# Patient Record
Sex: MALE | Race: WHITE | NOT HISPANIC OR LATINO | URBAN - METROPOLITAN AREA
[De-identification: names, ages, dates, MRNs, and addresses within clinical notes are randomized per-mention and may not be internally consistent; named-entity substitution may affect disease eponyms.]

---

## 2017-01-03 ENCOUNTER — HOME CARE/HOSPICE - HEALTHEAST (OUTPATIENT)
Dept: HOME HEALTH SERVICES | Facility: HOME HEALTH | Age: 82
End: 2017-01-03

## 2017-01-05 ENCOUNTER — COMMUNICATION - HEALTHEAST (OUTPATIENT)
Dept: INTERNAL MEDICINE | Facility: CLINIC | Age: 82
End: 2017-01-05

## 2017-01-06 ENCOUNTER — AMBULATORY - HEALTHEAST (OUTPATIENT)
Dept: GERIATRICS | Facility: CLINIC | Age: 82
End: 2017-01-06

## 2017-01-09 ENCOUNTER — OFFICE VISIT - HEALTHEAST (OUTPATIENT)
Dept: GERIATRICS | Facility: CLINIC | Age: 82
End: 2017-01-09

## 2017-01-09 DIAGNOSIS — J18.9 CAP (COMMUNITY ACQUIRED PNEUMONIA): ICD-10-CM

## 2017-01-09 DIAGNOSIS — I48.0 PAROXYSMAL ATRIAL FIBRILLATION (H): ICD-10-CM

## 2017-01-09 DIAGNOSIS — I50.9 CONGESTIVE HEART FAILURE (H): ICD-10-CM

## 2017-01-09 DIAGNOSIS — I10 ESSENTIAL HYPERTENSION: ICD-10-CM

## 2017-01-10 ENCOUNTER — AMBULATORY - HEALTHEAST (OUTPATIENT)
Dept: GERIATRICS | Facility: CLINIC | Age: 82
End: 2017-01-10

## 2017-01-12 ENCOUNTER — OFFICE VISIT - HEALTHEAST (OUTPATIENT)
Dept: GERIATRICS | Facility: CLINIC | Age: 82
End: 2017-01-12

## 2017-01-12 DIAGNOSIS — I10 ESSENTIAL HYPERTENSION: ICD-10-CM

## 2017-01-12 DIAGNOSIS — I48.0 PAROXYSMAL ATRIAL FIBRILLATION (H): ICD-10-CM

## 2017-01-12 DIAGNOSIS — I50.9 CONGESTIVE HEART FAILURE (H): ICD-10-CM

## 2017-01-12 DIAGNOSIS — J18.9 CAP (COMMUNITY ACQUIRED PNEUMONIA): ICD-10-CM

## 2017-01-13 ENCOUNTER — AMBULATORY - HEALTHEAST (OUTPATIENT)
Dept: GERIATRICS | Facility: CLINIC | Age: 82
End: 2017-01-13

## 2017-01-16 ENCOUNTER — OFFICE VISIT - HEALTHEAST (OUTPATIENT)
Dept: GERIATRICS | Facility: CLINIC | Age: 82
End: 2017-01-16

## 2017-01-16 DIAGNOSIS — I10 ESSENTIAL HYPERTENSION: ICD-10-CM

## 2017-01-16 DIAGNOSIS — I50.9 CONGESTIVE HEART FAILURE (H): ICD-10-CM

## 2017-01-16 DIAGNOSIS — I48.0 PAROXYSMAL ATRIAL FIBRILLATION (H): ICD-10-CM

## 2017-01-16 DIAGNOSIS — J18.9 CAP (COMMUNITY ACQUIRED PNEUMONIA): ICD-10-CM

## 2017-01-17 ENCOUNTER — AMBULATORY - HEALTHEAST (OUTPATIENT)
Dept: GERIATRICS | Facility: CLINIC | Age: 82
End: 2017-01-17

## 2017-01-18 ENCOUNTER — COMMUNICATION - HEALTHEAST (OUTPATIENT)
Dept: INTERNAL MEDICINE | Facility: CLINIC | Age: 82
End: 2017-01-18

## 2017-01-18 ENCOUNTER — COMMUNICATION - HEALTHEAST (OUTPATIENT)
Dept: GERIATRICS | Facility: CLINIC | Age: 82
End: 2017-01-18

## 2017-01-18 ENCOUNTER — AMBULATORY - HEALTHEAST (OUTPATIENT)
Dept: LAB | Facility: CLINIC | Age: 82
End: 2017-01-18

## 2017-01-18 DIAGNOSIS — I48.91 ATRIAL FIBRILLATION (H): ICD-10-CM

## 2017-02-22 ENCOUNTER — OFFICE VISIT - HEALTHEAST (OUTPATIENT)
Dept: INTERNAL MEDICINE | Facility: CLINIC | Age: 82
End: 2017-02-22

## 2017-02-22 ENCOUNTER — COMMUNICATION - HEALTHEAST (OUTPATIENT)
Dept: INTERNAL MEDICINE | Facility: CLINIC | Age: 82
End: 2017-02-22

## 2017-02-22 DIAGNOSIS — S09.90XA HEAD INJURY, INITIAL ENCOUNTER: ICD-10-CM

## 2017-02-22 ASSESSMENT — MIFFLIN-ST. JEOR: SCORE: 1572.11

## 2017-03-01 ENCOUNTER — AMBULATORY - HEALTHEAST (OUTPATIENT)
Dept: LAB | Facility: CLINIC | Age: 82
End: 2017-03-01

## 2017-03-01 DIAGNOSIS — I48.91 ATRIAL FIBRILLATION (H): ICD-10-CM

## 2017-03-07 ENCOUNTER — COMMUNICATION - HEALTHEAST (OUTPATIENT)
Dept: INTERNAL MEDICINE | Facility: CLINIC | Age: 82
End: 2017-03-07

## 2017-03-07 DIAGNOSIS — R53.1 WEAKNESS: ICD-10-CM

## 2017-03-07 DIAGNOSIS — R26.89 BALANCE PROBLEM: ICD-10-CM

## 2017-03-10 ENCOUNTER — RECORDS - HEALTHEAST (OUTPATIENT)
Dept: ADMINISTRATIVE | Facility: OTHER | Age: 82
End: 2017-03-10

## 2017-03-15 ENCOUNTER — RECORDS - HEALTHEAST (OUTPATIENT)
Dept: ADMINISTRATIVE | Facility: OTHER | Age: 82
End: 2017-03-15

## 2017-03-20 ENCOUNTER — RECORDS - HEALTHEAST (OUTPATIENT)
Dept: ADMINISTRATIVE | Facility: OTHER | Age: 82
End: 2017-03-20

## 2017-03-23 ENCOUNTER — RECORDS - HEALTHEAST (OUTPATIENT)
Dept: ADMINISTRATIVE | Facility: OTHER | Age: 82
End: 2017-03-23

## 2017-03-31 ENCOUNTER — COMMUNICATION - HEALTHEAST (OUTPATIENT)
Dept: INTERNAL MEDICINE | Facility: CLINIC | Age: 82
End: 2017-03-31

## 2017-03-31 ENCOUNTER — RECORDS - HEALTHEAST (OUTPATIENT)
Dept: ADMINISTRATIVE | Facility: OTHER | Age: 82
End: 2017-03-31

## 2017-04-04 ENCOUNTER — COMMUNICATION - HEALTHEAST (OUTPATIENT)
Dept: INTERNAL MEDICINE | Facility: CLINIC | Age: 82
End: 2017-04-04

## 2017-04-04 ENCOUNTER — RECORDS - HEALTHEAST (OUTPATIENT)
Dept: ADMINISTRATIVE | Facility: OTHER | Age: 82
End: 2017-04-04

## 2017-04-05 ENCOUNTER — RECORDS - HEALTHEAST (OUTPATIENT)
Dept: ADMINISTRATIVE | Facility: OTHER | Age: 82
End: 2017-04-05

## 2017-04-05 ENCOUNTER — COMMUNICATION - HEALTHEAST (OUTPATIENT)
Dept: INTERNAL MEDICINE | Facility: CLINIC | Age: 82
End: 2017-04-05

## 2017-04-12 ENCOUNTER — AMBULATORY - HEALTHEAST (OUTPATIENT)
Dept: LAB | Facility: CLINIC | Age: 82
End: 2017-04-12

## 2017-04-12 DIAGNOSIS — I48.91 ATRIAL FIBRILLATION (H): ICD-10-CM

## 2017-05-17 ENCOUNTER — AMBULATORY - HEALTHEAST (OUTPATIENT)
Dept: LAB | Facility: CLINIC | Age: 82
End: 2017-05-17

## 2017-05-17 DIAGNOSIS — I48.91 ATRIAL FIBRILLATION (H): ICD-10-CM

## 2017-05-17 DIAGNOSIS — Z79.01 LONG TERM (CURRENT) USE OF ANTICOAGULANTS: ICD-10-CM

## 2017-06-07 ENCOUNTER — RECORDS - HEALTHEAST (OUTPATIENT)
Dept: ADMINISTRATIVE | Facility: OTHER | Age: 82
End: 2017-06-07

## 2017-06-21 ENCOUNTER — AMBULATORY - HEALTHEAST (OUTPATIENT)
Dept: LAB | Facility: CLINIC | Age: 82
End: 2017-06-21

## 2017-06-21 DIAGNOSIS — Z79.01 LONG TERM (CURRENT) USE OF ANTICOAGULANTS: ICD-10-CM

## 2017-06-21 DIAGNOSIS — I48.91 ATRIAL FIBRILLATION (H): ICD-10-CM

## 2017-07-20 ENCOUNTER — OFFICE VISIT - HEALTHEAST (OUTPATIENT)
Dept: INTERNAL MEDICINE | Facility: CLINIC | Age: 82
End: 2017-07-20

## 2017-07-20 DIAGNOSIS — I48.91 ATRIAL FIBRILLATION (H): ICD-10-CM

## 2017-07-20 DIAGNOSIS — R61 NIGHT SWEATS: ICD-10-CM

## 2017-07-20 DIAGNOSIS — R73.09 ELEVATED GLUCOSE: ICD-10-CM

## 2017-07-20 DIAGNOSIS — D64.9 ANEMIA: ICD-10-CM

## 2017-07-20 DIAGNOSIS — R53.83 FATIGUE: ICD-10-CM

## 2017-07-20 DIAGNOSIS — I10 HTN (HYPERTENSION): ICD-10-CM

## 2017-07-20 LAB — HBA1C MFR BLD: 5.8 % (ref 3.5–6)

## 2017-07-21 LAB
PATH ICD:: NORMAL
PROT PATTERN SERPL IFE-IMP: NORMAL
REVIEWING PATHOLOGIST: NORMAL

## 2017-07-24 LAB
ALBUMIN PERCENT: 63.1 % (ref 51–67)
ALBUMIN SERPL ELPH-MCNC: 4.2 G/DL (ref 3.2–4.7)
ALPHA 1 PERCENT: 2.2 % (ref 2–4)
ALPHA 2 PERCENT: 11.1 % (ref 5–13)
ALPHA1 GLOB SERPL ELPH-MCNC: 0.1 G/DL (ref 0.1–0.3)
ALPHA2 GLOB SERPL ELPH-MCNC: 0.7 G/DL (ref 0.4–0.9)
B-GLOBULIN SERPL ELPH-MCNC: 0.8 G/DL (ref 0.7–1.2)
BETA PERCENT: 11.8 % (ref 10–17)
GAMMA GLOB SERPL ELPH-MCNC: 0.8 G/DL (ref 0.6–1.4)
GAMMA GLOBULIN PERCENT: 11.8 % (ref 9–20)
PATH ICD:: NORMAL
PROT PATTERN SERPL ELPH-IMP: NORMAL
PROT SERPL-MCNC: 6.7 G/DL (ref 6–8)
REVIEWING PATHOLOGIST: NORMAL

## 2017-07-26 ENCOUNTER — AMBULATORY - HEALTHEAST (OUTPATIENT)
Dept: LAB | Facility: CLINIC | Age: 82
End: 2017-07-26

## 2017-07-26 DIAGNOSIS — I48.91 ATRIAL FIBRILLATION (H): ICD-10-CM

## 2017-07-26 DIAGNOSIS — Z79.01 LONG TERM (CURRENT) USE OF ANTICOAGULANTS: ICD-10-CM

## 2017-07-27 ENCOUNTER — OFFICE VISIT - HEALTHEAST (OUTPATIENT)
Dept: INTERNAL MEDICINE | Facility: CLINIC | Age: 82
End: 2017-07-27

## 2017-07-27 DIAGNOSIS — R13.10 DYSPHAGIA: ICD-10-CM

## 2017-07-27 DIAGNOSIS — D50.9 IDA (IRON DEFICIENCY ANEMIA): ICD-10-CM

## 2017-08-04 ENCOUNTER — AMBULATORY - HEALTHEAST (OUTPATIENT)
Dept: ADMINISTRATIVE | Facility: CLINIC | Age: 82
End: 2017-08-04

## 2017-08-04 DIAGNOSIS — R13.10 DYSPHAGIA: ICD-10-CM

## 2017-08-04 DIAGNOSIS — D50.9 IDA (IRON DEFICIENCY ANEMIA): ICD-10-CM

## 2017-08-14 ENCOUNTER — COMMUNICATION - HEALTHEAST (OUTPATIENT)
Dept: INTERNAL MEDICINE | Facility: CLINIC | Age: 82
End: 2017-08-14

## 2017-08-14 DIAGNOSIS — R13.10 DYSPHAGIA: ICD-10-CM

## 2017-08-15 ENCOUNTER — COMMUNICATION - HEALTHEAST (OUTPATIENT)
Dept: INTERNAL MEDICINE | Facility: CLINIC | Age: 82
End: 2017-08-15

## 2017-08-29 ENCOUNTER — AMBULATORY - HEALTHEAST (OUTPATIENT)
Dept: LAB | Facility: CLINIC | Age: 82
End: 2017-08-29

## 2017-08-29 DIAGNOSIS — I48.91 ATRIAL FIBRILLATION (H): ICD-10-CM

## 2017-08-29 DIAGNOSIS — Z79.01 LONG TERM (CURRENT) USE OF ANTICOAGULANTS: ICD-10-CM

## 2017-09-06 ENCOUNTER — RECORDS - HEALTHEAST (OUTPATIENT)
Dept: ADMINISTRATIVE | Facility: OTHER | Age: 82
End: 2017-09-06

## 2017-09-07 ENCOUNTER — RECORDS - HEALTHEAST (OUTPATIENT)
Dept: ADMINISTRATIVE | Facility: OTHER | Age: 82
End: 2017-09-07

## 2017-09-08 ENCOUNTER — RECORDS - HEALTHEAST (OUTPATIENT)
Dept: ADMINISTRATIVE | Facility: OTHER | Age: 82
End: 2017-09-08

## 2017-09-12 ENCOUNTER — COMMUNICATION - HEALTHEAST (OUTPATIENT)
Dept: INTERNAL MEDICINE | Facility: CLINIC | Age: 82
End: 2017-09-12

## 2017-09-29 ENCOUNTER — COMMUNICATION - HEALTHEAST (OUTPATIENT)
Dept: INTERNAL MEDICINE | Facility: CLINIC | Age: 82
End: 2017-09-29

## 2017-10-04 ENCOUNTER — AMBULATORY - HEALTHEAST (OUTPATIENT)
Dept: LAB | Facility: CLINIC | Age: 82
End: 2017-10-04

## 2017-10-04 DIAGNOSIS — I48.91 ATRIAL FIBRILLATION (H): ICD-10-CM

## 2017-10-04 DIAGNOSIS — Z79.01 LONG-TERM (CURRENT) USE OF ANTICOAGULANTS: ICD-10-CM

## 2017-11-09 ENCOUNTER — AMBULATORY - HEALTHEAST (OUTPATIENT)
Dept: LAB | Facility: CLINIC | Age: 82
End: 2017-11-09

## 2017-11-09 DIAGNOSIS — Z79.01 LONG-TERM (CURRENT) USE OF ANTICOAGULANTS: ICD-10-CM

## 2017-11-09 DIAGNOSIS — I48.91 ATRIAL FIBRILLATION (H): ICD-10-CM

## 2017-11-16 ENCOUNTER — RECORDS - HEALTHEAST (OUTPATIENT)
Dept: ADMINISTRATIVE | Facility: OTHER | Age: 82
End: 2017-11-16

## 2017-11-17 ENCOUNTER — COMMUNICATION - HEALTHEAST (OUTPATIENT)
Dept: INTERNAL MEDICINE | Facility: CLINIC | Age: 82
End: 2017-11-17

## 2017-11-17 ENCOUNTER — RECORDS - HEALTHEAST (OUTPATIENT)
Dept: ADMINISTRATIVE | Facility: OTHER | Age: 82
End: 2017-11-17

## 2017-11-28 ENCOUNTER — RECORDS - HEALTHEAST (OUTPATIENT)
Dept: GENERAL RADIOLOGY | Facility: CLINIC | Age: 82
End: 2017-11-28

## 2017-11-28 ENCOUNTER — OFFICE VISIT - HEALTHEAST (OUTPATIENT)
Dept: INTERNAL MEDICINE | Facility: CLINIC | Age: 82
End: 2017-11-28

## 2017-11-28 DIAGNOSIS — R07.81 PLEURODYNIA: ICD-10-CM

## 2017-11-28 DIAGNOSIS — R07.81 RIB PAIN ON LEFT SIDE: ICD-10-CM

## 2017-12-19 ENCOUNTER — AMBULATORY - HEALTHEAST (OUTPATIENT)
Dept: LAB | Facility: CLINIC | Age: 82
End: 2017-12-19

## 2017-12-19 DIAGNOSIS — Z79.01 LONG-TERM (CURRENT) USE OF ANTICOAGULANTS: ICD-10-CM

## 2017-12-19 DIAGNOSIS — I48.91 ATRIAL FIBRILLATION (H): ICD-10-CM

## 2018-01-29 ENCOUNTER — AMBULATORY - HEALTHEAST (OUTPATIENT)
Dept: LAB | Facility: CLINIC | Age: 83
End: 2018-01-29

## 2018-01-29 DIAGNOSIS — I48.91 ATRIAL FIBRILLATION (H): ICD-10-CM

## 2018-01-29 DIAGNOSIS — Z79.01 LONG-TERM (CURRENT) USE OF ANTICOAGULANTS: ICD-10-CM

## 2018-01-29 LAB — INR PPP: 1.92 (ref 0.9–1.1)

## 2018-02-27 ENCOUNTER — AMBULATORY - HEALTHEAST (OUTPATIENT)
Dept: LAB | Facility: CLINIC | Age: 83
End: 2018-02-27

## 2018-02-27 ENCOUNTER — RECORDS - HEALTHEAST (OUTPATIENT)
Dept: ADMINISTRATIVE | Facility: OTHER | Age: 83
End: 2018-02-27

## 2018-02-27 DIAGNOSIS — Z79.01 LONG-TERM (CURRENT) USE OF ANTICOAGULANTS: ICD-10-CM

## 2018-02-27 DIAGNOSIS — I48.91 ATRIAL FIBRILLATION (H): ICD-10-CM

## 2018-02-27 LAB — INR PPP: 1.82 (ref 0.9–1.1)

## 2018-04-05 ENCOUNTER — AMBULATORY - HEALTHEAST (OUTPATIENT)
Dept: LAB | Facility: CLINIC | Age: 83
End: 2018-04-05

## 2018-04-05 DIAGNOSIS — I48.91 ATRIAL FIBRILLATION (H): ICD-10-CM

## 2018-04-05 DIAGNOSIS — Z79.01 LONG-TERM (CURRENT) USE OF ANTICOAGULANTS: ICD-10-CM

## 2018-04-05 LAB — INR PPP: 3.45 (ref 0.9–1.1)

## 2018-04-12 ENCOUNTER — COMMUNICATION - HEALTHEAST (OUTPATIENT)
Dept: INTERNAL MEDICINE | Facility: CLINIC | Age: 83
End: 2018-04-12

## 2018-04-13 ENCOUNTER — OFFICE VISIT - HEALTHEAST (OUTPATIENT)
Dept: INTERNAL MEDICINE | Facility: CLINIC | Age: 83
End: 2018-04-13

## 2018-04-13 DIAGNOSIS — I48.91 ATRIAL FIBRILLATION (H): ICD-10-CM

## 2018-04-13 DIAGNOSIS — R19.7 DIARRHEA: ICD-10-CM

## 2018-04-13 LAB
ALBUMIN SERPL-MCNC: 3.7 G/DL (ref 3.5–5)
ALP SERPL-CCNC: 89 U/L (ref 45–120)
ALT SERPL W P-5'-P-CCNC: 12 U/L (ref 0–45)
ANION GAP SERPL CALCULATED.3IONS-SCNC: 8 MMOL/L (ref 5–18)
AST SERPL W P-5'-P-CCNC: 15 U/L (ref 0–40)
BASOPHILS # BLD AUTO: 0 THOU/UL (ref 0–0.2)
BASOPHILS NFR BLD AUTO: 1 % (ref 0–2)
BILIRUB SERPL-MCNC: 1.3 MG/DL (ref 0–1)
BUN SERPL-MCNC: 8 MG/DL (ref 8–28)
CALCIUM SERPL-MCNC: 9.2 MG/DL (ref 8.5–10.5)
CHLORIDE BLD-SCNC: 106 MMOL/L (ref 98–107)
CO2 SERPL-SCNC: 27 MMOL/L (ref 22–31)
CREAT SERPL-MCNC: 0.82 MG/DL (ref 0.7–1.3)
EOSINOPHIL # BLD AUTO: 0.1 THOU/UL (ref 0–0.4)
EOSINOPHIL NFR BLD AUTO: 2 % (ref 0–6)
ERYTHROCYTE [DISTWIDTH] IN BLOOD BY AUTOMATED COUNT: 12.6 % (ref 11–14.5)
GFR SERPL CREATININE-BSD FRML MDRD: >60 ML/MIN/1.73M2
GLUCOSE BLD-MCNC: 89 MG/DL (ref 70–125)
HCT VFR BLD AUTO: 42.6 % (ref 40–54)
HGB BLD-MCNC: 14.4 G/DL (ref 14–18)
INR PPP: 3.85 (ref 0.9–1.1)
LYMPHOCYTES # BLD AUTO: 1.9 THOU/UL (ref 0.8–4.4)
LYMPHOCYTES NFR BLD AUTO: 39 % (ref 20–40)
MCH RBC QN AUTO: 31.6 PG (ref 27–34)
MCHC RBC AUTO-ENTMCNC: 33.9 G/DL (ref 32–36)
MCV RBC AUTO: 93 FL (ref 80–100)
MONOCYTES # BLD AUTO: 0.4 THOU/UL (ref 0–0.9)
MONOCYTES NFR BLD AUTO: 7 % (ref 2–10)
NEUTROPHILS # BLD AUTO: 2.4 THOU/UL (ref 2–7.7)
NEUTROPHILS NFR BLD AUTO: 51 % (ref 50–70)
PLATELET # BLD AUTO: 177 THOU/UL (ref 140–440)
PMV BLD AUTO: 7.7 FL (ref 7–10)
POTASSIUM BLD-SCNC: 4.2 MMOL/L (ref 3.5–5)
PROT SERPL-MCNC: 6.8 G/DL (ref 6–8)
RBC # BLD AUTO: 4.57 MILL/UL (ref 4.4–6.2)
SODIUM SERPL-SCNC: 141 MMOL/L (ref 136–145)
WBC: 4.8 THOU/UL (ref 4–11)

## 2018-04-16 ENCOUNTER — AMBULATORY - HEALTHEAST (OUTPATIENT)
Dept: LAB | Facility: CLINIC | Age: 83
End: 2018-04-16

## 2018-04-16 DIAGNOSIS — R19.7 DIARRHEA: ICD-10-CM

## 2018-04-17 ENCOUNTER — COMMUNICATION - HEALTHEAST (OUTPATIENT)
Dept: LAB | Facility: CLINIC | Age: 83
End: 2018-04-17

## 2018-04-17 LAB
G LAMBLIA AG STL QL IA: NORMAL
SHIGA TOXIN 1: NEGATIVE
SHIGA TOXIN 2: NEGATIVE

## 2018-04-19 LAB — BACTERIA SPEC CULT: NORMAL

## 2021-05-27 ENCOUNTER — RECORDS - HEALTHEAST (OUTPATIENT)
Dept: ADMINISTRATIVE | Facility: CLINIC | Age: 86
End: 2021-05-27

## 2021-05-28 ENCOUNTER — RECORDS - HEALTHEAST (OUTPATIENT)
Dept: ADMINISTRATIVE | Facility: CLINIC | Age: 86
End: 2021-05-28

## 2021-05-29 ENCOUNTER — RECORDS - HEALTHEAST (OUTPATIENT)
Dept: ADMINISTRATIVE | Facility: CLINIC | Age: 86
End: 2021-05-29

## 2021-05-30 ENCOUNTER — RECORDS - HEALTHEAST (OUTPATIENT)
Dept: ADMINISTRATIVE | Facility: CLINIC | Age: 86
End: 2021-05-30

## 2021-05-30 VITALS — WEIGHT: 188 LBS | BODY MASS INDEX: 26.98 KG/M2

## 2021-05-30 VITALS — BODY MASS INDEX: 27.16 KG/M2 | HEIGHT: 71 IN | WEIGHT: 194 LBS

## 2021-06-01 ENCOUNTER — RECORDS - HEALTHEAST (OUTPATIENT)
Dept: ADMINISTRATIVE | Facility: CLINIC | Age: 86
End: 2021-06-01

## 2021-06-02 ENCOUNTER — RECORDS - HEALTHEAST (OUTPATIENT)
Dept: ADMINISTRATIVE | Facility: CLINIC | Age: 86
End: 2021-06-02

## 2021-06-08 NOTE — PROGRESS NOTES
[facility]      Code Status FULL CODE  Visit Type: Review Of Multiple Medical Conditions     Facility:  [facility] [[id number]]           History of Present Illness: Rasta Chiu is a 83 y.o. 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 he was admitted ;an x-ray did not show any pulmonary edema and infiltrates.  he was given diuretics , oxygen ,nebs and based on his symptoms antibiotics were initiated .his influenza titers were negative and sputum cultures did not show any organism other than regular johnathan.  he continues to have minimally productive cough and continues to feel short of breath and is improving slowly in the TCU- he was discharged on a five day course of Levaquin and remains afebrile.  he was also felt to be somewhat volume overloaded and given diuretics but his symptoms did not improve x-ray test did not show any evidence of volume overload or pulmonary edema  his echocardiogram in January 2016 had shown up result ejection fraction of 60 to 65% with a mild LVH he has been discharge with monitoring of weights and low sodium diet he was not given any lasix on dc.  his INR today's 3.3 and he's on chronic Coumadin anticoagulation other than that he's profoundly weak with baseline left lower extremity weakness and is in the TCU for rehabilitation.  His pain is stable and he is no longer coughing and congested.No change in weights from admission noted and his orthostatic static blood pressures are stable to with with no drops noted  .  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     High cholesterol      Hypertension      Stroke      Past Surgical History   Procedure Laterality Date     Hernia repair       Lumbar laminectomy Bilateral 1/18/2016     Procedure: BILATERAL L4-5 LAMINECTOMY;  Surgeon: Dawit Davis MD;  Location: [facility] Main OR;  Service:      Family History   Problem Relation Age of Onset     Hypertension Mother      Pernicious anemia Mother 75     Pneumonia Father 47     Anesthesia problems Neg Hx      Social History     Social History     Marital status:      Spouse name: N/A     Number of children: N/A     Years of education: N/A     Occupational History     Not on file.     Social History Main Topics     Smoking status: Former Smoker     Smokeless tobacco: Not on file     Alcohol use No     Drug use: No     Sexual activity: Not on file     Other Topics Concern     Not on file     Social History Narrative    Lives in his own home    No falls     Current Outpatient Prescriptions   Medication Sig Dispense Refill     albuterol (PROVENTIL) 2.5 mg /3 mL (0.083 %) nebulizer solution Take 3 mL (2.5 mg total) by nebulization every 4 (four) hours as needed for wheezing. 75 mL 12     albuterol (PROVENTIL) 2.5 mg /3 mL (0.083 %) nebulizer solution Take 3 mL (2.5 mg total) by nebulization 4 (four) times a day. 75 mL 12     aspirin 81 MG EC tablet Take 81 mg by mouth daily.       cyanocobalamin (VITAMIN B-12) 250 MCG tablet Take 500 mcg by mouth daily.       cyanocobalamin 500 MCG tablet Take 500 mcg by mouth daily.       famotidine (PEPCID) 20 MG tablet Take 20 mg by mouth 2 (two) times a day as needed. bid as needed.        guaiFENesin ER (MUCINEX) 600 mg 12 hr tablet Take 1 tablet (600 mg total) by mouth 2 (two) times a day.  0     metoprolol tartrate (LOPRESSOR) 50 MG tablet Take 25 mg by mouth 2 (two) times a day. 1/2 tablet       WARFARIN SODIUM (WARFARIN ORAL) Take by mouth. 1/10/17 INR 2.8. Take 5mg daily Next INR  1/13/17 1/6/17 INR 2.8  Continue taking 5mg daily.  Next INR 1/9/17.       No current facility-administered medications for this visit.      Allergies   Allergen Reactions     Atorvastatin Unknown     Penicillins      Joints turned black and he was red all over     Sotalol Itching     Tikosyn [Dofetilide] Hives, Itching and Rash     Review of Systems:    Constitutional: Negative.  Negative for fever, chills, has activity change, appetite change and fatigue.   HENT: Negative for congestion and facial swelling.    Eyes: Negative for photophobia, redness and visual disturbance.   Respiratory: POSITIVE  for cough and NO chest tightness.    Cardiovascular: Negative for chest pain, palpitations and leg swelling.   Gastrointestinal: Negative for nausea, diarrhea, constipation, blood in stool and abdominal distention.   Genitourinary: Negative.    Musculoskeletal: Negative.  weak L>R  Skin: Negative.    Neurological: Negative for dizziness, tremors, syncope, weakness, light-headedness and headaches.   Hematological: Does not bruise/bleed easily.   Psychiatric/Behavioral: Negative.      Vitals:    01/12/17 1709   BP: 132/75   Pulse: 67   Resp: 17   Temp: 97  F (36.1  C)       Physical Exam:    GENERAL: no acute distress. Cooperative in conversation. Hearing loss b/l  HEENT: pupils are equal, round and reactive. Oral mucosa is moist and intact.  RESP:Chest symmetric. Regular respiratory rate. No stridor.  CVS: S1S2 ; pacemaker present  ABD: Nondistended, soft.  EXTREMITIES: No lower extremity edema. LLE weakness  NEURO: non focal. Alert and oriented x3.   PSYCH: within normal limits. No depression or anxiety.  SKIN: warm dry intact     Labs:    Xr Chest Ap Portable  Result Date: 1/2/2017  XR CHEST AP PORTABLE 1/2/2017 9:09 AM INDICATION: cough, dyspnea COMPARISON: 12/15/2015. FINDINGS: There is mild atelectasis or scarring at the right lung base. The left lung is clear. The atelectasis seen at the left base on the prior  study has resolved. Heart size is upper normal. There is no overt pulmonary edema, pneumothorax or pleural effusion. Since the prior study dual-chamber pacemaker is in place with leads over the right atrium and right ventricle. There are old healed right posterior rib fractures no acute fractures are identified.      Assessment/Plan:    1 Acute bronchitis/acute dyspnea and possible early pneumonia  patient was given broad-spectrum antibiotic and now currently is on oral Levaquin for five day treatment along with scheduled nebulizer treatments  he continues to have cough but minimal and  no fever  monitor clinically in the TCU.    2 CHF with possible volume overload- x-ray was negative for any evidence off pulmonary edema. he was treated with diuretics in the hospital and continued with his schedule beta blockers but no lasix on dc.  he has been discharged on a low sodium diet and close monitoring of his weights-no change     3 atrial fibrillation on chronic anticoagulation with coumadin  He is also on metoprolol 50mg.    4 generalized weakness he's here for PT OT and rehabilitation    5 hearing impairment quite profound in spite of a hearing aid    6Hypertension stable blood pressures- continue to monitor in the TCU    7 CVA remote history in the past; he does have left lower extremity weakness and is here for strengthening;    on asa 81mg  Patient is currently working in therapy on strengthening with baseline left lower extremity weakness and history of spinal stenoses  CPT was 5.3 and he is alert  Total time spent was 35 minutes, more than half of it was in face-to-face counseling regarding disease state, treatment, side effects, documentation, review of clinical data and coordination of care  Monitor routine labs and INRs    Electronically signed by: SHUKRI Kaplan  This progress note was completed using Dragon software and there may be grammatical errors.

## 2021-06-08 NOTE — PROGRESS NOTES
[facility]      Code Status FULL CODE  Visit Type: Review Of Multiple Medical Conditions     Facility:  [facility] [[id number]]           History of Present Illness: Rasta Chiu is a 83 y.o. male Who is currently admitted in the TCU as a transfer from the hospital  patient has a hearing impairment in spite of a hearing aid . 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 he was admitted ;an x-ray did not show any pulmonary edema and infiltrates.  he was given diuretics , oxygen ,nebs and based on his symptoms antibiotics were initiated .his influenza titers were negative and sputum cultures did not show any organism other than regular johnathan.  he continues to have minimally productive cough and continues to feel short of breath and is improving slowly in the TCU- he was done with abx and feels much better now.  he was also felt to be somewhat volume overloaded and given diuretics but his symptoms did not improve x-ray test did not show any evidence of volume overload or pulmonary edema  his echocardiogram in January 2016 had shown up result ejection fraction of 60 to 65% with a mild LVH he has been discharge with monitoring of weights and low sodium diet he was not given any lasix on dc.  his INR today's 2.6 and he's on chronic Coumadin anticoagulation other than that he's profoundly weak with baseline left lower extremity weakness and is in the TCU for rehabilitation.  His pain is stable and he is no longer coughing and congested.No change in weights from admission noted and his orthostatic static blood pressures are stable to with with no drops noted.  Requesting dc home this week  .  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     High  cholesterol      Hypertension      Stroke      Past Surgical History   Procedure Laterality Date     Hernia repair       Lumbar laminectomy Bilateral 1/18/2016     Procedure: BILATERAL L4-5 LAMINECTOMY;  Surgeon: Dawit Davis MD;  Location: [facility] Main OR;  Service:      Family History   Problem Relation Age of Onset     Hypertension Mother      Pernicious anemia Mother 75     Pneumonia Father 47     Anesthesia problems Neg Hx      Social History     Social History     Marital status:      Spouse name: N/A     Number of children: N/A     Years of education: N/A     Occupational History     Not on file.     Social History Main Topics     Smoking status: Former Smoker     Smokeless tobacco: Not on file     Alcohol use No     Drug use: No     Sexual activity: Not on file     Other Topics Concern     Not on file     Social History Narrative    Lives in his own home    No falls     Current Outpatient Prescriptions   Medication Sig Dispense Refill     albuterol (PROVENTIL) 2.5 mg /3 mL (0.083 %) nebulizer solution Take 3 mL (2.5 mg total) by nebulization every 4 (four) hours as needed for wheezing. 75 mL 12     albuterol (PROVENTIL) 2.5 mg /3 mL (0.083 %) nebulizer solution Take 3 mL (2.5 mg total) by nebulization 4 (four) times a day. 75 mL 12     aspirin 81 MG EC tablet Take 81 mg by mouth daily.       cyanocobalamin (VITAMIN B-12) 250 MCG tablet Take 500 mcg by mouth daily.       cyanocobalamin 500 MCG tablet Take 500 mcg by mouth daily.       famotidine (PEPCID) 20 MG tablet Take 20 mg by mouth 2 (two) times a day as needed. bid as needed.        guaiFENesin ER (MUCINEX) 600 mg 12 hr tablet Take 1 tablet (600 mg total) by mouth 2 (two) times a day.  0     metoprolol tartrate (LOPRESSOR) 50 MG tablet Take 25 mg by mouth 2 (two) times a day. 1/2 tablet       WARFARIN SODIUM (WARFARIN ORAL) Take by mouth daily. 1/13/17 INR 1.88. Take 5.5mg daily. Next INR 1/16/17  1/10/17 INR 2.8. Take 5mg daily Next INR  1/13/17 1/6/17 INR 2.8  Continue taking 5mg daily.  Next INR 1/9/17.       No current facility-administered medications for this visit.      Allergies   Allergen Reactions     Atorvastatin Unknown     Penicillins      Joints turned black and he was red all over     Sotalol Itching     Tikosyn [Dofetilide] Hives, Itching and Rash     Review of Systems:    Constitutional: Negative.  Negative for fever, chills, has activity change, appetite change and fatigue.   HENT: Negative for congestion and facial swelling.    Eyes: Negative for photophobia, redness and visual disturbance.   Respiratory: POSITIVE  for cough and NO chest tightness.    Cardiovascular: Negative for chest pain, palpitations and leg swelling.   Gastrointestinal: Negative for nausea, diarrhea, constipation, blood in stool and abdominal distention.   Genitourinary: Negative.    Musculoskeletal: Negative.  weak L>R  Skin: Negative.    Neurological: Negative for dizziness, tremors, syncope, weakness, light-headedness and headaches.   Hematological: Does not bruise/bleed easily.   Psychiatric/Behavioral: Negative.      Vitals:    01/16/17 1224   BP: 126/69   Pulse: 61   Temp: 97  F (36.1  C)       Physical Exam:    GENERAL: no acute distress. Cooperative in conversation. Hearing loss b/l  HEENT: pupils are equal, round and reactive. Oral mucosa is moist and intact.  RESP:Chest symmetric. Regular respiratory rate. No stridor.  CVS: S1S2 ; pacemaker present  ABD: Nondistended, soft.  EXTREMITIES: No lower extremity edema. LLE weakness  NEURO: non focal. Alert and oriented x3.   PSYCH: within normal limits. No depression or anxiety.  SKIN: warm dry intact     Labs:    Xr Chest Ap Portable  Result Date: 1/2/2017  XR CHEST AP PORTABLE 1/2/2017 9:09 AM INDICATION: cough, dyspnea COMPARISON: 12/15/2015. FINDINGS: There is mild atelectasis or scarring at the right lung base. The left lung is clear. The atelectasis seen at the left base on the prior study has  resolved. Heart size is upper normal. There is no overt pulmonary edema, pneumothorax or pleural effusion. Since the prior study dual-chamber pacemaker is in place with leads over the right atrium and right ventricle. There are old healed right posterior rib fractures no acute fractures are identified.      Assessment/Plan:    1 Acute bronchitis/acute dyspnea and possible early pneumonia  patient was given broad-spectrum antibiotic and now currently is on oral Levaquin for five day treatment along with scheduled nebulizer treatments  he continues to have slight cough but minimal and  no fever  monitor clinically in the TCU.    2 CHF with possible volume overload- x-ray was negative for any evidence off pulmonary edema. he was treated with diuretics in the hospital and continued with his schedule beta blockers but no lasix on dc.  he has been discharged on a low sodium diet and close monitoring of his weights-no change     3 atrial fibrillation on chronic anticoagulation with coumadin  He is also on metoprolol 50mg.    4 generalized weakness he's here for PT OT and rehabilitation    5 hearing impairment quite profound in spite of a hearing aid    6Hypertension stable blood pressures- continue to monitor in the TCU    7 CVA remote history in the past; he does have left lower extremity weakness and is here for strengthening;    on asa 81mg  Patient is currently working in therapy on strengthening with baseline left lower extremity weakness and history of spinal stenoses  CPT was 5.3 and he is alert  He is hoping to dc home in a few days and feels is ready now-SW to help with dc planning    Electronically signed by: SHUKRI Kaplan  This progress note was completed using Dragon software and there may be grammatical errors.

## 2021-06-08 NOTE — PROGRESS NOTES
[facility]      Code Status FULL CODE  Visit Type: H & P     Facility:  [facility] [[id number]]           History of Present Illness: Rasta Chiu is a 83 y.o. 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 he was admitted ;an x-ray did not show any pulmonary edema and infiltrates.  he was given diuretics , oxygen ,nebs and based on his symptoms antibiotics were initiated .his influenza titers were negative and sputum cultures did not show any organism other than regular johnathan.  he continues to have minimally productive cough and continues to feel short of breath and is improving slowly in the TCU- he was discharged on a five day course of Levaquin and remains afebrile.  he was also felt to be somewhat volume overloaded and given diuretics but his symptoms did not improve x-ray test did not show any evidence of volume overload or pulmonary edema  his echocardiogram in January 2016 had shown up result ejection fraction of 60 to 65% with a mild LVH he has been discharge with monitoring of weights and low sodium diet he was not given any lasix on dc.  his INR today's 3.3 and he's on chronic Coumadin anticoagulation other than that he's profoundly weak with baseline left lower extremity weakness and is in the TCU for rehabilitation.  .  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     High cholesterol      Hypertension      Stroke      Past Surgical History   Procedure Laterality Date     Hernia repair       Lumbar laminectomy Bilateral 1/18/2016      Procedure: BILATERAL L4-5 LAMINECTOMY;  Surgeon: Dawit Davis MD;  Location: [facility] Main OR;  Service:      Family History   Problem Relation Age of Onset     Hypertension Mother      Pernicious anemia Mother 75     Pneumonia Father 47     Anesthesia problems Neg Hx      Social History     Social History     Marital status:      Spouse name: N/A     Number of children: N/A     Years of education: N/A     Occupational History     Not on file.     Social History Main Topics     Smoking status: Former Smoker     Smokeless tobacco: Not on file     Alcohol use No     Drug use: No     Sexual activity: Not on file     Other Topics Concern     Not on file     Social History Narrative    Lives in his own home    No falls     Current Outpatient Prescriptions   Medication Sig Dispense Refill     albuterol (PROVENTIL) 2.5 mg /3 mL (0.083 %) nebulizer solution Take 3 mL (2.5 mg total) by nebulization every 4 (four) hours as needed for wheezing. 75 mL 12     albuterol (PROVENTIL) 2.5 mg /3 mL (0.083 %) nebulizer solution Take 3 mL (2.5 mg total) by nebulization 4 (four) times a day. 75 mL 12     aspirin 81 MG EC tablet Take 81 mg by mouth daily.       cyanocobalamin (VITAMIN B-12) 250 MCG tablet Take 500 mcg by mouth daily.       cyanocobalamin 500 MCG tablet Take 500 mcg by mouth daily.       famotidine (PEPCID) 20 MG tablet Take 20 mg by mouth 2 (two) times a day as needed. bid as needed.        guaiFENesin ER (MUCINEX) 600 mg 12 hr tablet Take 1 tablet (600 mg total) by mouth 2 (two) times a day.  0     levoFLOXacin (LEVAQUIN) 500 MG tablet Take 1 tablet (500 mg total) by mouth Daily at 6:00 am for 4 days. 4 tablet 0     metoprolol tartrate (LOPRESSOR) 50 MG tablet Take 25 mg by mouth 2 (two) times a day. 1/2 tablet       WARFARIN SODIUM (WARFARIN ORAL) Take by mouth. 1/6/17 INR 2.8  Continue taking 5mg daily.  Next INR 1/9/17.       No current facility-administered medications for this visit.      Allergies    Allergen Reactions     Atorvastatin Unknown     Penicillins      Joints turned black and he was red all over     Sotalol Itching     Tikosyn [Dofetilide] Hives, Itching and Rash     Review of Systems:    Constitutional: Negative.  Negative for fever, chills, has activity change, appetite change and fatigue.   HENT: Negative for congestion and facial swelling.    Eyes: Negative for photophobia, redness and visual disturbance.   Respiratory: POSITIVE  for cough and NO chest tightness.    Cardiovascular: Negative for chest pain, palpitations and leg swelling.   Gastrointestinal: Negative for nausea, diarrhea, constipation, blood in stool and abdominal distention.   Genitourinary: Negative.    Musculoskeletal: Negative.  weak L>R  Skin: Negative.    Neurological: Negative for dizziness, tremors, syncope, weakness, light-headedness and headaches.   Hematological: Does not bruise/bleed easily.   Psychiatric/Behavioral: Negative.      Vitals:    01/09/17 1445   BP: 137/80   Pulse: 75   Resp: 17   Temp: 97  F (36.1  C)       Physical Exam:    GENERAL: no acute distress. Cooperative in conversation. Hearing loss b/l  HEENT: pupils are equal, round and reactive. Oral mucosa is moist and intact.  RESP:Chest symmetric. Regular respiratory rate. No stridor.  CVS: S1S2 ; pacemaker present  ABD: Nondistended, soft.  EXTREMITIES: No lower extremity edema. LLE weakness  NEURO: non focal. Alert and oriented x3.   PSYCH: within normal limits. No depression or anxiety.  SKIN: warm dry intact     Labs:    Recent Results (from the past 240 hour(s))   ECG 12 lead nursing unit performed   Result Value Ref Range    SYSTOLIC BLOOD PRESSURE  mmHg    DIASTOLIC BLOOD PRESSURE  mmHg    VENTRICULAR RATE 71 BPM    ATRIAL RATE 300 BPM    P-R INTERVAL  ms    QRS DURATION 132 ms    Q-T INTERVAL 420 ms    QTC CALCULATION (BEZET) 456 ms    P Axis  degrees    R AXIS -77 degrees    T AXIS 90 degrees    MUSE DIAGNOSIS       Ventricular-paced  rhythm  Atrial flutter  Abnormal ECG  When compared with ECG of 23-JAN-2016 11:53,  Electronic ventricular pacemaker is now Present  Confirmed by JANICE HERNANDEZ MD LOC: ([id number]) on 1/2/2017 4:45:13 PM     Culture, Blood Anaerobic Bottle   Result Value Ref Range    Anaerobic Blood Culture Bottle No Growth No Growth, No organisms seen, bottle returned to instrument   Culture, Blood Aerobic   Result Value Ref Range    Aerobic Blood Culture Bottle No Growth No Growth, No organisms seen, bottle returned to instrument   Comprehensive Metabolic Panel   Result Value Ref Range    Sodium 140 136 - 145 mmol/L    Potassium 3.8 3.5 - 5.0 mmol/L    Chloride 105 98 - 107 mmol/L    CO2 20 (L) 22 - 31 mmol/L    Anion Gap, Calculation 15 5 - 18 mmol/L    Glucose 126 (H) 70 - 125 mg/dL    BUN 16 8 - 28 mg/dL    Creatinine 0.94 0.70 - 1.30 mg/dL    GFR MDRD Af Amer >60 >60 mL/min/1.73m2    GFR MDRD Non Af Amer >60 >60 mL/min/1.73m2    Bilirubin, Total 1.4 (H) 0.0 - 1.0 mg/dL    Calcium 9.0 8.5 - 10.5 mg/dL    Protein, Total 7.2 6.0 - 8.0 g/dL    Albumin 3.7 3.5 - 5.0 g/dL    Alkaline Phosphatase 62 45 - 120 U/L    AST 29 0 - 40 U/L    ALT 18 0 - 45 U/L   APTT   Result Value Ref Range    PTT 51 (H) 24 - 37 seconds   INR   Result Value Ref Range    INR 2.06 (H) 0.90 - 1.10   Lactic Acid   Result Value Ref Range    Lactic Acid 2.2 0.5 - 2.2 mmol/L   Procalcitonin   Result Value Ref Range    Procalcitonin 0.21 0.00 - 0.49 ng/mL   Culture, Blood Anaerobic Bottle   Result Value Ref Range    Anaerobic Blood Culture Bottle No Growth No Growth, No organisms seen, bottle returned to instrument   Culture, Blood Aerobic   Result Value Ref Range    Aerobic Blood Culture Bottle No Growth No Growth, No organisms seen, bottle returned to instrument   Troponin I   Result Value Ref Range    Troponin I 0.04 0.00 - 0.29 ng/mL   HM1 (CBC with Diff)   Result Value Ref Range    WBC 9.9 4.0 - 11.0 thou/uL    RBC 4.31 (L) 4.40 - 6.20 mill/uL    Hemoglobin  11.3 (L) 14.0 - 18.0 g/dL    Hematocrit 35.8 (L) 40.0 - 54.0 %    MCV 83 80 - 100 fL    MCH 26.3 (L) 27.0 - 34.0 pg    MCHC 31.7 (L) 32.0 - 36.0 g/dL    RDW 16.8 (H) 11.0 - 14.5 %    Platelets 191 140 - 440 thou/uL    MPV 8.6 7.0 - 10.0 fL    Neutrophils % 73 (H) 50 - 70 %    Lymphocytes % 17 (L) 20 - 40 %    Monocytes % 9 2 - 10 %    Eosinophils % 0 0 - 6 %    Basophils % 0 0 - 2 %    Neutrophils Absolute 7.3 2.0 - 7.7 thou/uL    Lymphocytes Absolute 1.7 0.8 - 4.4 thou/uL    Monocytes Absolute 0.9 0.0 - 0.9 thou/uL    Eosinophils Absolute 0.0 0.0 - 0.4 thou/uL    Basophils Absolute 0.0 0.0 - 0.2 thou/uL   BNP(B-type Natriuretic Peptide)   Result Value Ref Range     (H) 0 - 93 pg/mL   Type and Screen   Result Value Ref Range    ABORh A POS     Antibody Screen Negative Negative   Influenza A/B Rapid Test   Result Value Ref Range    Influenza  A, Rapid Antigen No Influenza A antigen detected No Influenza A antigen detected    Influenza B, Rapid Antigen No Influenza B antigen detected No Influenza B antigen detected   Culture/Gram Stain: Sputum   Result Value Ref Range    Culture Usual Galilea     Gram Stain Result 4+ Polymorphonuclear leukocytes     Gram Stain Result 4+ Gram positive cocci in pairs and chains     Gram Stain Result 2+ Gram negative bacilli     Gram Stain Result 2+ Gram negative diplococci     Gram Stain Result 1+ Gram positive bacilli     Gram Stain Result 1+ Yeast    Troponin I   Result Value Ref Range    Troponin I 0.05 0.00 - 0.29 ng/mL   Troponin I   Result Value Ref Range    Troponin I 0.05 0.00 - 0.29 ng/mL   INR   Result Value Ref Range    INR 2.84 (H) 0.90 - 1.10   Basic Metabolic Panel   Result Value Ref Range    Sodium 142 136 - 145 mmol/L    Potassium 3.4 (L) 3.5 - 5.0 mmol/L    Chloride 102 98 - 107 mmol/L    CO2 28 22 - 31 mmol/L    Anion Gap, Calculation 12 5 - 18 mmol/L    Glucose 107 70 - 125 mg/dL    Calcium 8.8 8.5 - 10.5 mg/dL    BUN 17 8 - 28 mg/dL    Creatinine 0.96 0.70 -  1.30 mg/dL    GFR MDRD Af Amer >60 >60 mL/min/1.73m2    GFR MDRD Non Af Amer >60 >60 mL/min/1.73m2   Platelet Count - every other day x 3   Result Value Ref Range    Platelets 183 140 - 440 thou/uL   INR   Result Value Ref Range    INR 2.74 (H) 0.90 - 1.10   Basic Metabolic Panel   Result Value Ref Range    Sodium 142 136 - 145 mmol/L    Potassium 4.3 3.5 - 5.0 mmol/L    Chloride 103 98 - 107 mmol/L    CO2 27 22 - 31 mmol/L    Anion Gap, Calculation 12 5 - 18 mmol/L    Glucose 105 70 - 125 mg/dL    Calcium 9.0 8.5 - 10.5 mg/dL    BUN 17 8 - 28 mg/dL    Creatinine 1.01 0.70 - 1.30 mg/dL    GFR MDRD Af Amer >60 >60 mL/min/1.73m2    GFR MDRD Non Af Amer >60 >60 mL/min/1.73m2     Xr Chest Ap Portable  Result Date: 1/2/2017  XR CHEST AP PORTABLE 1/2/2017 9:09 AM INDICATION: cough, dyspnea COMPARISON: 12/15/2015. FINDINGS: There is mild atelectasis or scarring at the right lung base. The left lung is clear. The atelectasis seen at the left base on the prior study has resolved. Heart size is upper normal. There is no overt pulmonary edema, pneumothorax or pleural effusion. Since the prior study dual-chamber pacemaker is in place with leads over the right atrium and right ventricle. There are old healed right posterior rib fractures no acute fractures are identified.    INR 3.3      Assessment/Plan:    1 Acute bronchitis/acute dyspnea and possible early pneumonia  patient was given broad-spectrum antibiotic and now currently is on oral Levaquin for five day treatment along with scheduled nebulizer treatments  he continues to have cough but no fever  monitor clinically in the TCU.    2 CHF with possible volume overload- x-ray was negative for any evidence off pulmonary edema. he was treated with diuretics in the hospital and continued with his schedule beta blockers but no lasix on dc.  he has been discharged on a low sodium diet and close monitoring of his weights    3 atrial fibrillation on chronic anticoagulation  heart  rates are stable INR is supratherapeutic so would hold his Coumadin tonight and check closely tomorrow  his Coumadin dose was decreased as he was going on antibiotics  He is also on metoprolol 50mg.    4 generalized weakness he's here for PT OT and rehabilitation    5 hearing impairment quite profound in spite of a hearing aid    6Hypertension stable blood pressures- continue to monitor in the TCU    7 CVA remote history in the past; he does have left lower extremity weakness and is here for strengthening;    on asa 81mg  Total time spent was 45 minutes, more than half of it was in face-to-face counseling regarding disease state, treatment, side effects, documentation, review of clinical data and coordination of care  Monitor routine labs and INRs    Electronically signed by: SHUKRI Kaplan  This progress note was completed using Dragon software and there may be grammatical errors.

## 2021-06-08 NOTE — PROGRESS NOTES
[facility] Patient Outreach:     Discharge Date::  1-      Reason for TCU stay (discharge diagnosis)::  CAP      Are you feeling better, the same or worse since your discharge?:  Patient is feeling better          As part of your discharge plan, did they discuss home care with you?: Yes        Have your seen them yet, or are they scheduled to visit?: Yes

## 2021-06-09 ENCOUNTER — RECORDS - HEALTHEAST (OUTPATIENT)
Dept: ADMINISTRATIVE | Facility: CLINIC | Age: 86
End: 2021-06-09

## 2021-06-09 NOTE — PROGRESS NOTES
[facility] Note  Patient Name: Rasta Chiu  Patient Age: 83 y.o.  YOB: 1933  MRN: [id number]  ?  Date of Visit: 2/22/2017  Reason for Office Visit:   Chief Complaint   Patient presents with     Fall     pt fell at home on 2/20/17-pt on coumadin-feels fine today       HPI: Rasta Chiu 83 y.o. male who presents to clinic for fall on Monday. He was in his bedroom and when backing up, hit the end of the bed, fell backwards on right side and hit his head on the door. No LOC. No bruising or lacerations. He sees a physical therapist at home, and she thought because he is coumadin a fib. Since the fall he has been feeling fine. No dizziness, headaches, blurry/double vision.        Review of Systems: As noted in HPI     Current Scheduled Meds:  Outpatient Encounter Prescriptions as of 2/22/2017   Medication Sig Dispense Refill     aspirin 81 MG EC tablet Take 81 mg by mouth daily.       cyanocobalamin (VITAMIN B-12) 250 MCG tablet Take 500 mcg by mouth daily.       metoprolol tartrate (LOPRESSOR) 50 MG tablet Take 25 mg by mouth 2 (two) times a day. 1/2 tablet       WARFARIN SODIUM (WARFARIN ORAL) Take by mouth daily. 1/13/17 INR 1.88. Take 5.5mg daily. Next INR 1/16/17  1/10/17 INR 2.8. Take 5mg daily Next INR 1/13/17 1/6/17 INR 2.8  Continue taking 5mg daily.  Next INR 1/9/17.       albuterol (PROVENTIL) 2.5 mg /3 mL (0.083 %) nebulizer solution Take 3 mL (2.5 mg total) by nebulization every 4 (four) hours as needed for wheezing. 75 mL 12     albuterol (PROVENTIL) 2.5 mg /3 mL (0.083 %) nebulizer solution Take 3 mL (2.5 mg total) by nebulization 4 (four) times a day. 75 mL 12     cyanocobalamin 500 MCG tablet Take 500 mcg by mouth daily.       famotidine (PEPCID) 20 MG tablet Take 20 mg by mouth 2 (two) times a day as needed. bid as needed.        guaiFENesin ER (MUCINEX) 600 mg 12 hr tablet Take 1 tablet (600 mg total) by mouth 2 (two) times a day.  0     No  "facility-administered encounter medications on file as of 2/22/2017.        Objective / Physical Examination:  Visit Vitals     /76     Pulse 77     Ht 5' 11\" (1.803 m)     Wt 194 lb (88 kg)     SpO2 97%     BMI 27.06 kg/m2     Wt Readings from Last 3 Encounters:   02/22/17 194 lb (88 kg)   01/16/17 188 lb (85.3 kg)   01/04/17 186 lb 11.2 oz (84.7 kg)     Body mass index is 27.06 kg/(m^2). (>25?)    General Appearance: Alert and oriented in no acute distress  Head: Normocephalic, atraumatic. No tenderness over right occipital area where he hit head.   Lungs: Clear to auscultation bilaterally. Normal inspiratory and expiratory effort.   Cardiovascular: RRR S1, S2  Neuro: Alert and oriented, follows commands appropriately. Grossly normal. Cranial nerves II-XII intact and normal. Gait normal. Finger-nose neg. Walks with cane    Assessment / Plan / Medical Decision Making:      Encounter Diagnoses   Name Primary?     Head injury, initial encounter Yes        1. Head injury, initial encounter    Fall 2 days ago, hitting right occipital area. No brursing, tenderness or neurological deficits on exam. He is higher risk being on coumadin however given his clinical exam, I decided to defer imaging.     Stressed importance that if he starts to experience any worsening dizziness, headaches, vision or speech changes, weakness he should go to the ED immediately.     Return for Next scheduled follow up.  Or earlier if needed    Total time spent with patient was 15 minutes with >50% of time spent in face-to-face counseling regarding the above plan     Jorge Avila MD  [facility]   "

## 2021-06-11 NOTE — PROGRESS NOTES
Clinic Note    Assessment:     Assessment and Plan:  1. Fatigue  I am a bit concerned that this is either significant iron deficiency anemia or with his night sweats that this is a lymphoma or bone marrow related issue.  Multiple labs will be obtained and patient will see me early next week for follow-up and discussion.  I did not have any specific recommendations for him at this time except to stay well-hydrated and eat right.  His blood sugar was slightly elevated and will add a glycohemoglobin.  - Glucose; Future  - Glucose  - Comprehensive Metabolic Panel  - Thyroid Stimulating Hormone (TSH)  - HM2(CBC w/o Differential)  - Iron and Transferrin Iron Binding Capacity  - Vitamin B12  - Ferritin    2. Anemia  Please see above but will check lab work today and determine if this is a cause of his fatigue and if there is something more worrisome is causing this.  - HM2(CBC w/o Differential)  - Iron and Transferrin Iron Binding Capacity  - Vitamin B12  - Ferritin    3. Atrial fibrillation  Stable and on Coumadin.  Continue same   - Thyroid Stimulating Hormone (TSH)    4. HTN (hypertension)  Relatively low on the medium dose of beta-blocker.  Will decide next week if we should decrease this.  - Comprehensive Metabolic Panel    5. Elevated glucose  Blood sugar fasting today was 120 something so we will go ahead and add a glycohemoglobin  - Glycosylated Hemoglobin A1c    6. Night sweats  The face of #1 I think this is a little concerning and requires further workup and investigation.  - Lactate Dehydrogenase (LDH)  - Electrophoresis, Protein, Serum  - Immunofixation Electrophoresis, Serum       Patient Instructions   Eat right and stay hydrated.             Return in about 3 months (around 10/20/2017) for bp, heart, etc.         Subjective:      Rasta Chiu is a 84 y.o. male comes in for question about his blood sugar but really he is doing this because he has felt fatigued.  Is been going on for a few months.   Reviewed his medications and nothing changed there.  He also at the end of the visit told me that he has been having night sweats most every night which are significant and been going on for some months also.  No fever or chills.  He has not noticed any lymph node swelling anywhere.  He has been somewhat short of breath at times with exertion.  Some left lower chest discomfort with rest and exercise seems more with movement.  Not ischemic sounding.  A bit of a cough on occasion.  Denies any blood in stool black tarry stools.  Occasional dysphasia.  Is on Coumadin and aspirin.  No change in the dose of his metoprolol.  He has not tried anything to alleviate his symptoms.  Is not anything specific that is knows of that exacerbates symptoms.  He is not on a PPI.    The following portions of the patient's history were reviewed and updated as appropriate: Past medical history, allergies, medications, lab work in January showing normal renal function but mildly anemic and MCV was a little bit low.  He was hospitalized with pneumonia at that time.  He is not seen me since then.  He has A. fib and is on Coumadin.  INRs have been done and have not been high in fact last one was somewhat low.  Chest x-ray in January did not see any masses and it had a pacemaker placed at that time no infiltrate no tumors.    Review of Systems:    Most of the complete review of systems is in the HPI above.  Otherwise negative.  History   Smoking Status     Former Smoker   Smokeless Tobacco     Not on file         Objective:     Vitals:    07/20/17 0908   BP: 108/62   Pulse: 81       Exam:  Vital signs stable patient looks in his normal state of health.  Neck axilla or groin without nodes  CV- RRR(he must be paced) no murmur gallop or JVD  Lungs-clear  Abdomen-benign no HSM  Extremities-2+ edema  Overall he looks a little pale but conjunctivae do not look too bad  Skin large lesion on his scalp from he said running into a tree.  It looks like  this is taken some time to heal.  Encouraged him to use Vaseline.    Patient Active Problem List   Diagnosis     Coronary Artery Disease     Atrial fibrillation     CVA (cerebral infarction)     Dysphagia     HTN (hypertension)     ICD (implantable cardioverter-defibrillator) in place     Anemia     Current Outpatient Prescriptions   Medication Sig Dispense Refill     aspirin 81 MG EC tablet Take 81 mg by mouth daily.       cyanocobalamin (VITAMIN B-12) 250 MCG tablet Take 500 mcg by mouth daily.       metoprolol tartrate (LOPRESSOR) 50 MG tablet Take 25 mg by mouth 2 (two) times a day. 1/2 tablet       WARFARIN SODIUM (WARFARIN ORAL) Take by mouth daily. 1/13/17 INR 1.88. Take 5.5mg daily. Next INR 1/16/17  1/10/17 INR 2.8. Take 5mg daily Next INR 1/13/17 1/6/17 INR 2.8  Continue taking 5mg daily.  Next INR 1/9/17.       No current facility-administered medications for this visit.          Jayson Carranza    7/20/2017

## 2021-06-12 NOTE — PROGRESS NOTES
Clinic Note    Assessment:     Assessment and Plan:  1. JAY JAY (iron deficiency anemia)  We discussed this in detail and he is going to like to have workup which will include EGD and CT colonoscopy.  The CT exam is really not meant to be looking for small polyps which are unlikely to cause him any problem in life but any colon cancer that would need to be found at this time and removed if present.  I will have him start iron now but stopped 1 week before the procedure and restart afterwards.  We will plan on checking again in 3-4 months.  Is a high  2. Dysphagia  This is a distant history and occasionally has some heartburn symptoms now.  Not really concerning symptoms at this time.       Patient Instructions   Please note that if over the counter medications were taken off of your medication list, it is not because you are being asked to stop taking them.  does not want all of the over the counter medications on your medication list, as it bogs down the list.       Flu shot this Fall!!!     Iron twice per day - 325 mg-     You can stop the coumadin 4 days prior to the endoscopy and restart after you get home from the procedure.     No Follow-up on file.         Subjective:      Rasta Chiu is a 84 y.o. male comes in for follow-up of his iron deficiency anemia and some fatigue.  Labs showed iron deficiency and her discussion today was about what to do about it.  Colonoscopy was done about 10 years ago.  He thinks he may have had some endoscopy in the distant past as well or some other esophageal type test.  Nothing within the past 5 years.  He is not on a PPI.  No other obvious cause for JAY JAY.  Some heartburn indigestion especially at night for which he uses Tums.  No CV pulmonary or  symptoms.    The following portions of the patient's history were reviewed and updated as appropriate: Past medical history, allergies, medications, iron deficiency lab results and I reviewed the chart to find  colonoscopy but could not find one.    Review of Systems:    Fatigue symptoms above otherwise negative  History   Smoking Status     Former Smoker   Smokeless Tobacco     Not on file         Objective:     Vitals:    07/27/17 1145   BP: 122/62   Pulse: 72       Exam:  Patient looks well no acute distress and does not look too anemic.  Vital signs are stable and he is in no acute distress.  Mental status and thinking is clear.      Patient Active Problem List   Diagnosis     Coronary Artery Disease     Atrial fibrillation     CVA (cerebral infarction)     Dysphagia     HTN (hypertension)     ICD (implantable cardioverter-defibrillator) in place     Anemia     Current Outpatient Prescriptions   Medication Sig Dispense Refill     aspirin 81 MG EC tablet Take 81 mg by mouth daily.       metoprolol tartrate (LOPRESSOR) 50 MG tablet Take 25 mg by mouth 2 (two) times a day. 1/2 tablet       WARFARIN SODIUM (WARFARIN ORAL) Take by mouth daily. 1/13/17 INR 1.88. Take 5.5mg daily. Next INR 1/16/17  1/10/17 INR 2.8. Take 5mg daily Next INR 1/13/17 1/6/17 INR 2.8  Continue taking 5mg daily.  Next INR 1/9/17.       No current facility-administered medications for this visit.          Jayson Carranza    7/27/2017

## 2021-06-14 NOTE — PROGRESS NOTES
Clinic Note    Assessment:     Assessment and Plan:  1. Rib pain on left side  I think I do see a fracture on the x-ray.  Patient is encouraged not to lean over on the side.  He has had broken ribs and the other side.  He is not too painful.  He cannot believe that he broke this just by leaning over.  I suggested he is older and needs to be cautious.  Avoid leaning on that side for the next month or so.  No pain meds needed.  Nothing else unusual seen on the x-ray.  - XR Ribs Left W PA Chest; Future     There are no Patient Instructions on file for this visit.  No Follow-up on file.         Subjective:      Rasta Chiu is a 84 y.o. male comes in complaining that about a month ago he leaned over and had excruciating pain in the left rib area.  It is painful with motion.  Hurts to cough.  He saw cardiology and she thought was pleurisy.  No chest x-ray was done.  Patient has persistence but it is not an every moment type pain.  Is painful to lay on that side.  Painful cough.  No shortness of breath.  He did not hear anything at the time that he had this happen.  No crunch etc.    The following portions of the patient's history were reviewed and updated as appropriate: Past medical history, allergy, medicines,    Review of Systems:    As mentioned above.  No other symptoms mentioned.  His balance is off.  He uses a walker.  History   Smoking Status     Former Smoker   Smokeless Tobacco     Not on file         Objective:     Vitals:    11/28/17 0938   BP: 124/78   Pulse: 72       Exam:  Lungs are nice and clear.  Breathing is fine and nonlabored  Left specific rib somewhere around the mid anterior axillary line around the fifth or sixth rib was tender to palpation.  There is no ecchymosis.  There is no crepitance.  Reproducible tenderness was there.  It was marked with an x and BB  Chest x-ray- I believe that there is a fracture that I can see although his ribs are relatively faint.  It is not significantly  displaced.  We will see what radiology reads.    Patient Active Problem List   Diagnosis     Coronary Artery Disease     Atrial fibrillation     CVA (cerebral infarction)     Dysphagia     HTN (hypertension)     ICD (implantable cardioverter-defibrillator) in place     Anemia     Current Outpatient Prescriptions   Medication Sig Dispense Refill     aspirin 81 MG EC tablet Take 81 mg by mouth daily.       metoprolol tartrate (LOPRESSOR) 50 MG tablet Take 25 mg by mouth 2 (two) times a day. 1/2 tablet       WARFARIN SODIUM (WARFARIN ORAL) Take by mouth daily. 1/13/17 INR 1.88. Take 5.5mg daily. Next INR 1/16/17  1/10/17 INR 2.8. Take 5mg daily Next INR 1/13/17 1/6/17 INR 2.8  Continue taking 5mg daily.  Next INR 1/9/17.       No current facility-administered medications for this visit.          Jayson Carranza    11/28/2017

## 2021-06-17 NOTE — PROGRESS NOTES
ASSESSMENT/PLAN:  1. Diarrhea  Sounds like infectious versus inflammatory colitis.  Will check labs as mentioned, start Pepto-Bismol, and then determine further workup such as flex sig is necessary.  - Culture, Stool; Future  - Giardia Detection, Stool  - C. difficile Toxigenic by PCR; Future  - HM1(CBC and Differential)  - Comprehensive Metabolic Panel  - INR  - HM1 (CBC with Diff)    2. Atrial fibrillation  Has not been eating well and will check an INR today make certain is not drifted up to high.  - INR      Patient Instructions   Take Pepto Bismol about five times per day.     Stay well hydrated.       Return if symptoms worsen or fail to improve.    CHIEF COMPLAINT:  Chief Complaint   Patient presents with     Diarrhea     on/off for about a month       HISTORY OF PRESENT ILLNESS:  Rasta Chiu is a 84 y.o. male presenting to the clinic today with complaints of diarrhea. He is accompanied by his wife.     Diarrhea: He has had episodes of diarrhea intermittently for the past month or so. His bowels were completely normal two months ago, which meant having a bowel movement every two or three days. The diarrhea occurred about once per week the first two weeks, but the episodes have been getting more frequent and more severe. It can wake him up during the night, and he needs to get to the bathroom urgently. He might spend a couple of hours in the bathroom at a time for a severe episode. The stool is black and watery. He has not had any fever or chills. There is no red blood or mucus in the stool. He quits eating when he has a flare, which helps for a short time. Taking Imodium also helps for a while. His wife has not had any changes in bowel habits. He had more frequent bowel movements from taking an iron supplement in the past, but he is not currently taking iron. He has not taken an antibiotic in the last month or two. He has not visited a hospital or a nursing home recently. He has not been around any  kids or pets lately.     Atrial Fibrillation: He anticoagulates on warfarin. His last INR was 3.45 on 4/5/2018    REVIEW OF SYSTEMS:   He takes a daily aspirin. Comprehensive review of systems negative except as noted above.    PFSH:  Reviewed, as below.     TOBACCO USE:  History   Smoking Status     Former Smoker   Smokeless Tobacco     Never Used       VITALS:  Vitals:    04/13/18 1054   BP: 122/62   Pulse: 72   Temp: 98.4  F (36.9  C)     Wt Readings from Last 3 Encounters:   02/22/17 194 lb (88 kg)   01/16/17 188 lb (85.3 kg)   01/04/17 186 lb 11.2 oz (84.7 kg)     There is no height or weight on file to calculate BMI.    PHYSICAL EXAM:  General Appearance: Alert, cooperative, no distress, appears stated age.  Lungs: Clear to auscultation bilaterally, good air movement.  Heart: Regular rate and rhythm, S1 and S2 normal, no murmur or bruit.  Abdomen: Soft, non-tender, no HSM or masses.  Psychiatric: he has a normal mood and affect.     Notes Reviewed, additional history from source other than patient (2 TOTAL): Reviewed 11/16/2017 EGD - gastritis.     Accessed Care Everywhere, Requested Records, Consult with Physician (1 TOTAL): None.     Radiology tests summarized or ordered (XR, CT, MRI, DXA, US): Reviewed 9/6/2017 CT colonography - diverticulosis, otherwise normal.     Labs reviewed or ordered (1 TOTAL): Labs ordered.     Medicine tests reviewed or ordered (ECG, echocardiogram, colonoscopy, EGD, venous US) (1 TOTAL): None.    Independent review of ECG or XR (2 EACH): None.    MEDICATIONS:  Current Outpatient Prescriptions   Medication Sig Dispense Refill     aspirin 81 MG EC tablet Take 81 mg by mouth daily.       metoprolol tartrate (LOPRESSOR) 50 MG tablet Take 25 mg by mouth 2 (two) times a day. 1/2 tablet       WARFARIN SODIUM (WARFARIN ORAL) Take by mouth daily. 1/13/17 INR 1.88. Take 5.5mg daily. Next INR 1/16/17  1/10/17 INR 2.8. Take 5mg daily Next INR 1/13/17 1/6/17 INR 2.8  Continue taking 5mg  daily.  Next INR 1/9/17.       No current facility-administered medications for this visit.        The visit lasted a total of 12 minutes face to face with the patient. Over 50% of the time was spent counseling and educating the patient about his diarrhea.    I, Satish Cardoso, am scribing for and in the presence of, Dr. Carranza.    I, Dr. Carranza, personally performed the services described in this documentation, as scribed by Satish Cardoso in my presence, and it is both accurate and complete.    Dragon dictation was used for this note.  Speech recognition errors are a possibility.      Total data points: 4

## 2021-08-03 PROBLEM — I48.92 ATRIAL FLUTTER (H): Status: RESOLVED | Noted: 2017-01-02 | Resolved: 2017-07-20
